# Patient Record
Sex: MALE | Race: WHITE | Employment: FULL TIME | URBAN - METROPOLITAN AREA
[De-identification: names, ages, dates, MRNs, and addresses within clinical notes are randomized per-mention and may not be internally consistent; named-entity substitution may affect disease eponyms.]

---

## 2024-05-02 ENCOUNTER — OFFICE VISIT (OUTPATIENT)
Dept: ORTHOPEDIC SURGERY | Age: 29
End: 2024-05-02
Payer: COMMERCIAL

## 2024-05-02 VITALS — BODY MASS INDEX: 24.38 KG/M2 | HEIGHT: 72 IN | WEIGHT: 180 LBS

## 2024-05-02 DIAGNOSIS — M25.551 PAIN OF RIGHT HIP: ICD-10-CM

## 2024-05-02 DIAGNOSIS — M24.851 SNAPPING HIP SYNDROME, RIGHT: Primary | ICD-10-CM

## 2024-05-02 PROCEDURE — 99204 OFFICE O/P NEW MOD 45 MIN: CPT | Performed by: ORTHOPAEDIC SURGERY

## 2024-05-02 PROCEDURE — G8427 DOCREV CUR MEDS BY ELIG CLIN: HCPCS | Performed by: ORTHOPAEDIC SURGERY

## 2024-05-02 PROCEDURE — 1036F TOBACCO NON-USER: CPT | Performed by: ORTHOPAEDIC SURGERY

## 2024-05-02 PROCEDURE — G8420 CALC BMI NORM PARAMETERS: HCPCS | Performed by: ORTHOPAEDIC SURGERY

## 2024-05-02 RX ORDER — NAPROXEN 500 MG/1
500 TABLET ORAL 2 TIMES DAILY WITH MEALS
Qty: 60 TABLET | Refills: 0 | Status: SHIPPED | OUTPATIENT
Start: 2024-05-02

## 2024-05-02 NOTE — PROGRESS NOTES
Date:  2024    Name:  Jh Gould  Address:  [address] [address] [address]    :  1995      Age:   28 y.o.    SSN:  [id number]      Medical Record Number:  [id number]    Reason for Visit:    Chief Complaint    Hip Pain (Right hip)      DOS:2024     HPI: Jh Gould is a 28 y.o. male here today for  evaluation of right murphy-lateral that has been on going for 9 day(s).  Patient endorses a deep anterior hip pain that radiates into the lateral aspect and posterior aspect of the hip now.  He denies any recent trauma or falls.  He started to notice the symptoms after working out at the gym several days ago, but was able to continue his workout and continue to bear weight.  Since then, he continues to have pain with prolonged standing or long car rides.  He has been taking Aleve daily for the last 5 days without significant improvement this time.  His wife recently underwent surgery with us and he has been attempting to do similar HEP exercises with mild improvement.  He currently works with a  and has previously had chiropractic but both of these have also do not relieve the patient's pain.    Pain assessment is documented below.     The patient denies any bowel/bladder symptoms, or any numbness, tingling, or weakness down the affected thigh or leg. The patient denies any prior hip injuries, surgeries, or any childhood history of hip disorders.    Jh Gould is currently working Full Time remotely from home.       Pain Assessment  Location of Pain: Hip  Location Modifiers: Right  Severity of Pain: 4  Quality of Pain: Sharp, Other (Comment) (sore)  Frequency of Pain: Constant  Aggravating Factors: Walking, Standing, Other (Comment) (sitting/laying)  Limiting Behavior: Yes  Relieving Factors: Rest, Ice, Heat, Nsaids  Result of Injury: No  Work-Related Injury: No    ROS: Review of systems reviewed from Patient History Form completed today and available in the